# Patient Record
Sex: MALE | Race: ASIAN
[De-identification: names, ages, dates, MRNs, and addresses within clinical notes are randomized per-mention and may not be internally consistent; named-entity substitution may affect disease eponyms.]

---

## 2017-08-03 ENCOUNTER — HOSPITAL ENCOUNTER (EMERGENCY)
Dept: HOSPITAL 74 - JERFT | Age: 62
Discharge: HOME | End: 2017-08-03
Payer: COMMERCIAL

## 2017-08-03 VITALS — SYSTOLIC BLOOD PRESSURE: 147 MMHG | DIASTOLIC BLOOD PRESSURE: 97 MMHG | HEART RATE: 64 BPM | TEMPERATURE: 98.1 F

## 2017-08-03 VITALS — BODY MASS INDEX: 29.2 KG/M2

## 2017-08-03 DIAGNOSIS — Z48.02: Primary | ICD-10-CM

## 2017-08-03 NOTE — PDOC
Suture Removal/Wound Check HPI





- History of Present Illness


Chief Complaint: Suture/Staple Removal(Here)


Stated Complaint: SUTURE REMOVAL


Time Seen by Provider: 08/03/17 12:28


History Source: Yes: Patient


Exam Limitations: Yes: No Limitations


Treated at: [facility] ED





- Previous ED Treatment


Type of procedure performed on last visit: Yes: Laceration Repair


Tetanus Immunization: Yes: Up to Date


Antibiotics Prescribed: No





Past History





- Travel


Traveled outside of the country in the last 30 days: No


Close contact w/someone who was outside of country & ill: No





- Past Medical History


Allergies/Adverse Reactions: 


Allergies





No Known Allergies Allergy (Verified 08/03/17 11:54)


 








Home Medications: 


Ambulatory Orders





Metoprolol Succinate [Toprol Xl] 100 mg PO DAILY 11/09/16 


Amlodipine Besylate 10 mg PO ASDIR 07/19/17 








General: Yes: no pertinent history





- Immunization History


Tetanus Status: Unknown





- Social History


Smoking Status: Never smoked


Number of Ciarettes Per Day: 0





Suture Removal/Wound Check PE





- Physical Exam


Laceration/Wound Check Symptoms: reports: None


Current Severity Level: None


Maximum Severity Level: None


Location of Laceration/Wound: left: Ankle (9 sutures removed from left ankle )


Pain Radiation: None





*Review of Systems





- Review of Systems


Able to Perform ROS?: Yes


Constitutional: Yes: Symptoms Reported, See HPI, Loss of Appetite


HEENTM: No: Symptoms Reported


Musculoskeletal: Yes: See HPI.  No: Symptoms Reported, Back Pain, Joint Pain, 

Joint Swelling


Integumentary: Yes: Symptoms Reported


Neurological: No: Symptoms reported


All Other Systems: Reviewed and Negative





Medical Decision Making





- Medical Decision Making





08/03/17 13:24 is removed from left ankle and right thumb without incident. 

Steri-Strips applied to poorly approximated left ankle laceration line 

midpoint. No purulent drainage, no evidence of cellulitis or infection.





*DC/Admit/Observation/Transfer


Diagnosis at time of Disposition: 


 Encounter for removal of sutures





- Discharge Dispostion


Disposition: HOME


Condition at time of disposition: Stable


Admit: No





- Referrals


Referrals: 


Xiomara Roy MD [Primary Care Provider] - 





- Patient Instructions





- Post Discharge Activity

## 2018-02-02 ENCOUNTER — HOSPITAL ENCOUNTER (OUTPATIENT)
Dept: HOSPITAL 74 - JER | Age: 63
Setting detail: OBSERVATION
LOS: 1 days | Discharge: HOME | End: 2018-02-03
Payer: COMMERCIAL

## 2018-02-02 VITALS — BODY MASS INDEX: 27.5 KG/M2

## 2018-02-02 DIAGNOSIS — E78.5: ICD-10-CM

## 2018-02-02 DIAGNOSIS — Z87.891: ICD-10-CM

## 2018-02-02 DIAGNOSIS — I10: ICD-10-CM

## 2018-02-02 DIAGNOSIS — E11.9: ICD-10-CM

## 2018-02-02 DIAGNOSIS — R11.2: ICD-10-CM

## 2018-02-02 DIAGNOSIS — Z79.84: ICD-10-CM

## 2018-02-02 DIAGNOSIS — K56.609: Primary | ICD-10-CM

## 2018-02-02 LAB
ALBUMIN SERPL-MCNC: 4.5 G/DL (ref 3.4–5)
ALP SERPL-CCNC: 107 U/L (ref 45–117)
ALT SERPL-CCNC: 24 U/L (ref 12–78)
ANION GAP SERPL CALC-SCNC: 11 MMOL/L (ref 8–16)
APTT BLD: 29.9 SECONDS (ref 26.9–34.4)
AST SERPL-CCNC: 14 U/L (ref 15–37)
BASOPHILS # BLD: 0.3 % (ref 0–2)
BILIRUB SERPL-MCNC: 0.8 MG/DL (ref 0.2–1)
BUN SERPL-MCNC: 12 MG/DL (ref 7–18)
CALCIUM SERPL-MCNC: 8.7 MG/DL (ref 8.5–10.1)
CHLORIDE SERPL-SCNC: 100 MMOL/L (ref 98–107)
CO2 SERPL-SCNC: 24 MMOL/L (ref 21–32)
CREAT SERPL-MCNC: 1.2 MG/DL (ref 0.7–1.3)
DEPRECATED RDW RBC AUTO: 13.1 % (ref 11.9–15.9)
EOSINOPHIL # BLD: 0 % (ref 0–4.5)
GLUCOSE SERPL-MCNC: 272 MG/DL (ref 74–106)
HCT VFR BLD CALC: 42.6 % (ref 35.4–49)
HGB BLD-MCNC: 13.6 GM/DL (ref 11.7–16.9)
INR BLD: 1.17 (ref 0.82–1.09)
LIPASE SERPL-CCNC: 117 U/L (ref 73–393)
LYMPHOCYTES # BLD: 5.7 % (ref 8–40)
MCH RBC QN AUTO: 27.3 PG (ref 25.7–33.7)
MCHC RBC AUTO-ENTMCNC: 31.9 G/DL (ref 32–35.9)
MCV RBC: 85.7 FL (ref 80–96)
MONOCYTES # BLD AUTO: 2.5 % (ref 3.8–10.2)
NEUTROPHILS # BLD: 91.5 % (ref 42.8–82.8)
PLATELET # BLD AUTO: 266 K/MM3 (ref 134–434)
PMV BLD: 10.6 FL (ref 7.5–11.1)
POTASSIUM SERPLBLD-SCNC: 4 MMOL/L (ref 3.5–5.1)
PROT SERPL-MCNC: 7.9 G/DL (ref 6.4–8.2)
PT PNL PPP: 13.2 SEC (ref 9.98–11.88)
RBC # BLD AUTO: 4.96 M/MM3 (ref 4–5.6)
SODIUM SERPL-SCNC: 135 MMOL/L (ref 136–145)
WBC # BLD AUTO: 13.7 K/MM3 (ref 4–10)

## 2018-02-02 PROCEDURE — G0378 HOSPITAL OBSERVATION PER HR: HCPCS

## 2018-02-02 PROCEDURE — 3E033NZ INTRODUCTION OF ANALGESICS, HYPNOTICS, SEDATIVES INTO PERIPHERAL VEIN, PERCUTANEOUS APPROACH: ICD-10-PCS

## 2018-02-02 PROCEDURE — 3E0337Z INTRODUCTION OF ELECTROLYTIC AND WATER BALANCE SUBSTANCE INTO PERIPHERAL VEIN, PERCUTANEOUS APPROACH: ICD-10-PCS

## 2018-02-02 PROCEDURE — 3E033GC INTRODUCTION OF OTHER THERAPEUTIC SUBSTANCE INTO PERIPHERAL VEIN, PERCUTANEOUS APPROACH: ICD-10-PCS

## 2018-02-02 RX ADMIN — INSULIN ASPART SCH: 100 INJECTION, SOLUTION INTRAVENOUS; SUBCUTANEOUS at 17:13

## 2018-02-02 RX ADMIN — HEPARIN SODIUM SCH UNIT: 5000 INJECTION, SOLUTION INTRAVENOUS; SUBCUTANEOUS at 21:57

## 2018-02-02 NOTE — PDOC
History of Present Illness





<Jourdan Gutierrez - Last Filed: 02/02/18 13:13>





- History of Present Illness


Initial Comments: 





02/02/18 09:18


"The patient is a 62 year old male with a significant PMH of HTN, hyperlipidemia

, and diabetes who presents to the emergency department with vomiting and 

abdominal pain beginning approximately last night. The patient notes he has had 

about 10 episodes of vomiting since last night with associated diffuse, burning 

abdominal pain. Denies diarrhea or constipation. However, he notes feeling as 

if his abdomen feels distended. The patient notes his last BM was at about 3AM 

last night. Of note, the patient ate a papaya at about 1PM yesterday and is 

concerned about food poisoning. Denies F/C.





The patient denies chest pain, shortness of breath, headache and dizziness. 


Denies fever, chills, diarrhea and constipation. 


Denies dysuria, frequency, urgency and hematuria.





Allergies: NKA


Past surgical history: Cholecystectomy. Unspecified eye surgery. 


Social history: Former smoker. No reported alcohol or drug use. 


PCP: Dr. Xiomara Roy


"





<Tavares Adhikari - Last Filed: 02/03/18 18:30>





- General


Chief Complaint: Nausea/Vomiting


Stated Complaint: VOMITING


Time Seen by Provider: 02/02/18 08:43





Past History





<Jourdan Gutierrez - Last Filed: 02/02/18 13:13>





- Past Medical History


COPD: No


Diabetes: Yes


GI Disorders: No


HTN: Yes


Hypercholesterolemia: Yes





- Surgical History


Cholecystectomy: Yes (11/11/16)





- Suicide/Smoking/Psychosocial Hx


Smoking History: Former smoker


Have you smoked in the past 12 months: No


Number of Cigarettes Smoked Daily: 0


Information on smoking cessation initiated: No


Hx Alcohol Use: No


Drug/Substance Use Hx: No


Substance Use Type: None





<Tavares Adhikari - Last Filed: 02/03/18 18:30>





- Past Medical History


Allergies/Adverse Reactions: 


 Allergies











Allergy/AdvReac Type Severity Reaction Status Date / Time


 


No Known Allergies Allergy   Verified 02/02/18 07:46











Home Medications: 


Ambulatory Orders





Metoprolol Succinate [Toprol Xl] 100 mg PO DAILY 11/09/16 


Amlodipine Besylate 10 mg PO DAILY 07/19/17 


Docusate Sodium [Colace] 100 mg PO DAILY 02/02/18 


Metformin HCl 500 mg PO BID 02/02/18 


Simvastatin 40 mg PO DAILY 02/02/18 


Acetaminophen [Tylenol .Regular Strength -] 650 mg PO Q4H PRN  tablet 02/03/18 











**Review of Systems





- Review of Systems


Comments:: 





02/02/18 09:28


"GENERAL/CONSTITUTIONAL: No fever or chills. No weakness.


HEAD, EYES, EARS, NOSE AND THROAT: No change in vision. No ear pain or 

discharge. No sore throat.


CARDIOVASCULAR: No chest pain or shortness of breath.


RESPIRATORY: No cough, wheezing, or hemoptysis.


GASTROINTESTINAL: (+) Vomiting. (+) Diffuse abdominal pain.  No diarrhea or 

constipation.


GENITOURINARY: No dysuria, frequency, or change in urination.


MUSCULOSKELETAL: No joint or muscle swelling or pain. No neck or back pain.


SKIN: No rash


NEUROLOGIC: No headache, vertigo, loss of consciousness, or change in strength/

sensation.


ENDOCRINE: No increased thirst. No abnormal weight change.


HEMATOLOGIC/LYMPHATIC: No anemia, easy bleeding, or history of blood clots.


ALLERGIC/IMMUNOLOGIC: No hives or skin allergy.


"





<Tavares Adhikari - Last Filed: 02/03/18 18:30>





*Physical Exam





- Vital Signs


 Last Vital Signs











Temp Pulse Resp BP Pulse Ox


 


 98.2 F   92 H  20   151/88   99 


 


 02/02/18 07:31  02/02/18 07:31  02/02/18 07:31  02/02/18 07:31  02/02/18 07:31














<Jourdan Gutierrez - Last Filed: 02/02/18 13:13>





- Vital Signs


 Last Vital Signs











Temp Pulse Resp BP Pulse Ox


 


 98.2 F   92 H  20   151/88   99 


 


 02/02/18 07:31  02/02/18 07:31  02/02/18 07:31  02/02/18 07:31  02/02/18 07:31














- Physical Exam


Comments: 





02/02/18 09:28


"GENERAL: Awake, alert, and fully oriented, in no acute distress


HEAD: No signs of trauma


EYES: PERRLA, EOMI, sclera anicteric, conjunctiva clear


ENT: Auricles normal inspection, hearing grossly normal, nares patent, 

oropharynx clear without exudates. Moist mucosa


NECK: Nontender, no stepoffs, Normal ROM, supple, no lymphadenopathy, JVD, or 

masses


LUNGS: Breath sounds equal, clear to auscultation bilaterally.  No wheezes, and 

no crackles


HEART: Regular rate and rhythm, normal S1 and S2, no murmurs, rubs or gallops


ABDOMEN: soft, diffuse TTP, non-tympanitic


EXTREMITIES: Normal range of motion, no edema.  No clubbing or cyanosis. No 

cords, erythema, or tenderness


NEUROLOGICAL: Cranial nerves II through XII intact. 5/5 strength and sensation 

in all extremities, Normal speech, normal gait


SKIN: Warm, Dry, normal turgor, no rashes or lesions noted.


"





<Tavares Adhikari - Last Filed: 02/03/18 18:30>





ED Treatment Course





- LABORATORY


CBC & Chemistry Diagram: 


 02/02/18 08:55





 02/02/18 08:55





- ADDITIONAL ORDERS


Additional order review: 


 Laboratory  Results











  02/02/18 02/02/18 02/02/18





  09:04 08:55 08:55


 


PT with INR   


 


INR   


 


PTT (Actin FS)   


 


Sodium    135 L


 


Potassium    4.0


 


Chloride    100


 


Carbon Dioxide    24


 


Anion Gap    11


 


BUN    12


 


Creatinine    1.2


 


Creat Clearance w eGFR    > 60


 


Random Glucose    272 H D


 


Calcium    8.7


 


Total Bilirubin    0.8


 


AST    14 L D


 


ALT    24  D


 


Alkaline Phosphatase    107  D


 


Creatine Kinase  67  


 


Troponin I  < 0.02  


 


Total Protein    7.9


 


Albumin    4.5  D


 


Lipase    117


 


Blood Type   AB POSITIVE 


 


Antibody Screen   Negative 














  02/02/18





  08:55


 


PT with INR  13.20 H


 


INR  1.17 H


 


PTT (Actin FS)  29.9


 


Sodium 


 


Potassium 


 


Chloride 


 


Carbon Dioxide 


 


Anion Gap 


 


BUN 


 


Creatinine 


 


Creat Clearance w eGFR 


 


Random Glucose 


 


Calcium 


 


Total Bilirubin 


 


AST 


 


ALT 


 


Alkaline Phosphatase 


 


Creatine Kinase 


 


Troponin I 


 


Total Protein 


 


Albumin 


 


Lipase 


 


Blood Type 


 


Antibody Screen 








 











  02/02/18





  08:55


 


RBC  4.96


 


MCV  85.7


 


MCHC  31.9 L


 


RDW  13.1


 


MPV  10.6  D


 


Neutrophils %  91.5 H D


 


Lymphocytes %  5.7 L D


 


Monocytes %  2.5 L


 


Eosinophils %  0.0  D


 


Basophils %  0.3














- Medications


Given in the ED: 


ED Medications














Discontinued Medications














Generic Name Dose Route Start Last Admin





  Trade Name Freq  PRN Reason Stop Dose Admin


 


Sodium Chloride  1,000 mls @ 1,000 mls/hr  02/02/18 08:49  02/02/18 09:03





  Normal Saline -  IV  02/02/18 09:48  1,000 mls/hr





  ASDIR STA   Administration


 


Morphine Sulfate  4 mg  02/02/18 08:49  02/02/18 09:03





  Morphine Injection -  IVPUSH  02/02/18 08:50  4 mg





  ONCE ONE   Administration


 


Ondansetron HCl  4 mg  02/02/18 08:49  02/02/18 09:00





  Zofran Injection  IVPB  02/02/18 08:50  4 mg





  ONCE ONE   Administration














- Additional Consults


Time Called: 13:05


Consult/PCP: Spoke [facility] Dr. Perera who accepted this patient for admission.





<Jourdan Gutierrez - Last Filed: 02/02/18 13:13>





- LABORATORY


CBC & Chemistry Diagram: 


 02/03/18 06:45





 02/03/18 06:45





- RADIOLOGY


Radiology Studies Ordered: 














 Category Date Time Status


 


 ABDOMEN & PELVIS CT WITH CONTR [CT] Stat CT Scan  02/02/18 08:53 Ordered


 


 ABDOMEN FLAT-UPRIGHT-LATERAL [RAD] Stat Radiology  02/02/18 08:48 Ordered














- Medications


Given in the ED: 


ED Medications














Discontinued Medications














Generic Name Dose Route Start Last Admin





  Trade Name Odalis  PRN Reason Stop Dose Admin


 


Morphine Sulfate  4 mg  02/02/18 08:49  02/02/18 09:03





  Morphine Injection -  IVPUSH  02/02/18 08:50  4 mg





  ONCE ONE   Administration


 


Ondansetron HCl  4 mg  02/02/18 08:49  02/02/18 09:00





  Zofran Injection  IVPB  02/02/18 08:50  4 mg





  ONCE ONE   Administration














<Tavares Adhikari - Last Filed: 02/03/18 18:30>





Medical Decision Making





- Medical Decision Making





02/02/18 09:29


62 M with abdominal distention, N+V. Concerning for SBO (pt has had prior lap 

leandro). Also consider pancreatitis. Anginal equivalent unlikely but pt with 

cardiac risk factors, so will r/o ACS with trop and EKG.


- Labs, lipase, trop


- EKG


- CTAP





02/02/18 12:52


CTAP consistent with partial SBO.


Pt's initial surgery done by Dr. Castellanos, who no longer practices here.


Page sent out to Dr. Berger.





Pt admitted to Dr. Perera.





Case discussed in detail with admitting physician including history, physical 

exam and ancillary studies.


Admitting physician has assumed care for the patient and will follow all 

pending diagnostics and complete the evaluation and treatment. 








<Tavares Adhikari - Last Filed: 02/03/18 18:30>





*DC/Admit/Observation/Transfer





- Attestations


Scribe Attestion: 





02/02/18 11:51





Documentation prepared by Jourdan Gutierrez, acting as medical scribe for Tavares Adhikari MD.





<Jourdan Gutierrez - Last Filed: 02/02/18 13:13>





- Discharge Dispostion


Admit: Yes





- Attestations


Physician Attestion: 





02/02/18 12:53








I, Dr. Tavares Adhikari MD, attest that this document has been prepared under my 

direction and personally reviewed by me in its entirety.   I further attest, 

that it accurately reflects all work, treatment, procedures and medical decision

-making performed by me.  





<Tavares Adhikari - Last Filed: 02/03/18 18:30>


Diagnosis at time of Disposition: 


 SBO (small bowel obstruction)








- Discharge Dispostion


Disposition: HOME


Condition at time of disposition: Stable

## 2018-02-02 NOTE — CONSULT
- Consultation


REQUESTING PROVIDER: Tavares Adhikari MD





CONSULT REQUEST: We have been asked to surgically evaluate this patient for (

specify).





PCP:Slade Perera





HISTORY OF PRESENT ILLNESS: CTSP who is a 61 y/o male who presented w/n/v/

abdominal pain; NOC; he feels better since arrival in the ER; he has never had 

this before; pain described a scrampy; he also had constipation; he gave 

himself an enema w/minimal result. He states he is passing flatus.








PMHx:  hypertension; NIDDM; hyperlipidemia        





PSHx:    lap leandro 2016    


 Home Medications











 Medication  Instructions  Recorded


 


Metoprolol Succinate [Toprol Xl] 100 mg PO DAILY 11/09/16


 


Amlodipine Besylate 10 mg PO DAILY 07/19/17


 


Docusate Sodium [Colace] 100 mg PO DAILY 02/02/18


 


Metformin HCl 500 mg PO BID 02/02/18


 


Simvastatin 40 mg PO DAILY 02/02/18








 Allergies











Allergy/AdvReac Type Severity Reaction Status Date / Time


 


No Known Allergies Allergy   Verified 02/02/18 07:46








PHYSICAL EXAM:


GENERAL: Awake, alert, and fully oriented, in no acute distress.


HEAD: Normal with no signs of trauma.


EYES:  sclera anicteric, conjunctiva clear.


NECK: Normal ROM, supple without lymphadenopathy, JVD, or masses.


ABDOMEN: Soft, nontender, not distended, normoactive bowel sounds, no guarding, 

no rebound, no masses.  No organomegaly. No tympany; no hernias; healed lap 

leandro port sites


MUSCULOSKELETAL: Normal ROM at all joints. No bony deformities or tenderness. 

No CVA tenderness.


UPPER EXTREMITIES: 2+ pulses, warm, well-perfused. No cyanosis. Cap refill <2 

seconds. No peripheral edema.


LOWER EXTREMITIES: 2+ pulses, warm, well-perfused. No calf tenderness. No 

peripheral edema. 


NEUROLOGICAL: Normal speech, gait not observed.


PSYCH: Cooperative. Good eye contact. Appropriate mood and affect.


SKIN: Warm, dry, normal turgor, no rashes or lesions noted.


 Vital Signs











Temperature  98.2 F   02/02/18 07:31


 


Pulse Rate  85   02/02/18 14:06


 


Respiratory Rate  18   02/02/18 14:06


 


Blood Pressure  146/66   02/02/18 14:06


 


O2 Sat by Pulse Oximetry (%)  99   02/02/18 14:06








 Lab Results











WBC  13.7 K/mm3 (4.0-10.0)  H D 02/02/18  08:55    


 


RBC  4.96 M/mm3 (4.00-5.60)   02/02/18  08:55    


 


Hgb  13.6 GM/dL (11.7-16.9)   02/02/18  08:55    


 


Hct  42.6 % (35.4-49)   02/02/18  08:55    


 


MCV  85.7 fl (80-96)   02/02/18  08:55    


 


MCHC  31.9 g/dl (32.0-35.9)  L  02/02/18  08:55    


 


RDW  13.1 % (11.9-15.9)   02/02/18  08:55    


 


Plt Count  266 K/MM3 (134-434)   02/02/18  08:55    


 


Sodium  135 mmol/L (136-145)  L  02/02/18  08:55    


 


Potassium  4.0 mmol/L (3.5-5.1)   02/02/18  08:55    


 


Chloride  100 mmol/L ()   02/02/18  08:55    


 


Carbon Dioxide  24 mmol/L (21-32)   02/02/18  08:55    


 


Anion Gap  11  (8-16)   02/02/18  08:55    


 


BUN  12 mg/dL (7-18)   02/02/18  08:55    


 


Creatinine  1.2 mg/dL (0.7-1.3)   02/02/18  08:55    


 


Random Glucose  272 mg/dL ()  H D 02/02/18  08:55    


 


Calcium  8.7 mg/dL (8.5-10.1)   02/02/18  08:55    


 


Blood Type  AB POSITIVE   02/02/18  12:02    


 


Antibody Screen  Negative   02/02/18  08:55    


 


INR  1.17  (0.82-1.09)  H  02/02/18  08:55    








W/U to date reviewed





IMP: No clearcut clinical and/or radiological evidence of significant SBO





PLAN: Would admit and keep NPO; IVF; serial exams and serial abdominal 

obstructive series x rays; will f/u.








Tavares Berger MD FACS





Visit type





- Case Type


Case Type: ED Admission





- Emergency


Emergency Visit: Yes


ED Registration Date: 02/02/18


Care time: The patient presented to the Emergency Department on the above date 

and was hospitalized for further evaluation of their emergent condition.





- New patient


This patient is new to me today: Yes


Date on this admission: 02/02/18





- Critical Care


Critical Care patient: No

## 2018-02-02 NOTE — HP
Admitting History and Physical





- Primary Care Physician


PCP: Xiomara Roy





- Admission


History of Present Illness: 





The patient is a 62 year old male --patient of Dr. ALLISON Roy , with a significant 

PMH of HTN, hyperlipidemia, cholecystectomy and diabetes who presents to the 

emergency department with vomiting and abdominal pain beginning approximately 

last night. The patient notes he has had about 10 episodes of vomiting since 

last night with associated diffuse, burning abdominal pain. Denies diarrhea or 

constipation. However, he notes feeling as if his abdomen feels distended. The 

patient notes his last BM was at about 3AM last night. Of note, the patient ate 

a papaya at about 1PM yesterday and is concerned about food poisoning. Denies F/

C.


The patient denies chest pain, shortness of breath, headache and dizziness. 


Denies fever, chills, diarrhea and constipation. 


Denies dysuria, frequency, urgency and hematuria.





CAT scan done in the  emergency room--- showed partial small bowel obstruction


Patient admitted to floor


Surgical consult requested  by emergency room physician--- patient seen by 

surgeon


I also discussed case with the ER physician


Patient seen by me on the floor


Feels better.


  Comfortable


Denies pain


Denies nausea or vomiting at present


Passing gas.





History Source: Patient


Limitations to Obtaining History: No Limitations





- Past Medical History


Cardiovascular: Yes: HTN


Endocrine: Yes: Diabetes Mellitus





- Past Surgical History


Past Surgical History: Yes: Cholecystectomy





- Smoking History


Smoking history: Former smoker


Have you smoked in the past 12 months: No


Aproximately how many cigarettes per day: 0





- Alcohol/Substance Use


Hx Alcohol Use: No





Home Medications





- Allergies


Allergies/Adverse Reactions: 


 Allergies











Allergy/AdvReac Type Severity Reaction Status Date / Time


 


No Known Allergies Allergy   Verified 02/02/18 07:46














- Home Medications


Home Medications: 


Ambulatory Orders





Metoprolol Succinate [Toprol Xl] 100 mg PO DAILY 11/09/16 


Amlodipine Besylate 10 mg PO DAILY 07/19/17 


Docusate Sodium [Colace] 100 mg PO DAILY 02/02/18 


Metformin HCl 500 mg PO BID 02/02/18 


Simvastatin 40 mg PO DAILY 02/02/18 











Family Disease History





- Family Disease History


Family History: Unremarkable





Review of Systems





- Review of Systems


Constitutional: reports: No Symptoms


Eyes: reports: No Symptoms


HENT: reports: No Symptoms


Neck: reports: No Symptoms


Cardiovascular: reports: No Symptoms


Respiratory: reports: No Symptoms


Gastrointestinal: reports: Abdominal Pain, Bloating, Nausea, Vomiting


Genitourinary: reports: No Symptoms


Neurological: reports: No Symptoms


Endocrine: reports: No Symptoms


Psychiatric: reports: No Symptoms





Physical Examination


Vital Signs: 


 Vital Signs











Temperature  98.2 F   02/02/18 07:31


 


Pulse Rate  85   02/02/18 14:06


 


Respiratory Rate  18   02/02/18 14:06


 


Blood Pressure  146/66   02/02/18 14:06


 


O2 Sat by Pulse Oximetry (%)  99   02/02/18 14:06











Constitutional: Yes: No Distress, Calm


Eyes: Yes: Conjunctiva Clear


Neck: Yes: Supple, Trachea Midline


Cardiovascular: Yes: Regular Rate and Rhythm


Respiratory: Yes: CTA Bilaterally


Gastrointestinal: Yes: Soft (Nontender. Bowel sounds present.)


Edema: No


Neurological: Yes: Alert


Psychiatric: Yes: Alert


Labs: 


 CBC, BMP





 02/02/18 08:55 





 02/02/18 08:55 











Imaging





- Results


X-ray: Report Reviewed


Cat Scan: Report Reviewed





Problem List





- Problems


(1) Diabetes


Code(s): E11.9 - TYPE 2 DIABETES MELLITUS WITHOUT COMPLICATIONS   





(2) Hypertension


Code(s): I10 - ESSENTIAL (PRIMARY) HYPERTENSION   





(3) Nausea and vomiting


Code(s): R11.2 - NAUSEA WITH VOMITING, UNSPECIFIED   


Qualifiers: 


   Vomiting type: unspecified   Vomiting Intractability: non-intractable   

Qualified Code(s): R11.2 - Nausea with vomiting, unspecified   





(4) SBO (small bowel obstruction)


Code(s): K56.609 - UNSP INTESTNL OBST, UNSP AS TO PARTIAL VERSUS COMPLETE OBST 

  





Assessment/Plan





I believe partial small bowel obstruction is resolving


Will keep nothing by mouth tonight


IV fluids


Monitor


Follow-up electrolytes


Anticipate short stay


Trial of liquid diet in the morning


If better--anticipate discharge soon.

## 2018-02-03 VITALS — HEART RATE: 63 BPM | TEMPERATURE: 97.7 F | SYSTOLIC BLOOD PRESSURE: 138 MMHG | DIASTOLIC BLOOD PRESSURE: 54 MMHG

## 2018-02-03 LAB
ALBUMIN SERPL-MCNC: 3.5 G/DL (ref 3.4–5)
ALP SERPL-CCNC: 86 U/L (ref 45–117)
ALT SERPL-CCNC: 25 U/L (ref 12–78)
ANION GAP SERPL CALC-SCNC: 6 MMOL/L (ref 8–16)
AST SERPL-CCNC: 17 U/L (ref 15–37)
BASOPHILS # BLD: 0.5 % (ref 0–2)
BILIRUB SERPL-MCNC: 0.7 MG/DL (ref 0.2–1)
BUN SERPL-MCNC: 10 MG/DL (ref 7–18)
CALCIUM SERPL-MCNC: 8.2 MG/DL (ref 8.5–10.1)
CHLORIDE SERPL-SCNC: 105 MMOL/L (ref 98–107)
CO2 SERPL-SCNC: 31 MMOL/L (ref 21–32)
CREAT SERPL-MCNC: 1.1 MG/DL (ref 0.7–1.3)
DEPRECATED RDW RBC AUTO: 13.4 % (ref 11.9–15.9)
EOSINOPHIL # BLD: 1.9 % (ref 0–4.5)
GLUCOSE SERPL-MCNC: 136 MG/DL (ref 74–106)
HCT VFR BLD CALC: 37 % (ref 35.4–49)
HGB BLD-MCNC: 12.1 GM/DL (ref 11.7–16.9)
LYMPHOCYTES # BLD: 39.7 % (ref 8–40)
MCH RBC QN AUTO: 27.8 PG (ref 25.7–33.7)
MCHC RBC AUTO-ENTMCNC: 32.6 G/DL (ref 32–35.9)
MCV RBC: 85.2 FL (ref 80–96)
MONOCYTES # BLD AUTO: 7.7 % (ref 3.8–10.2)
NEUTROPHILS # BLD: 50.2 % (ref 42.8–82.8)
PLATELET # BLD AUTO: 209 K/MM3 (ref 134–434)
PMV BLD: 9.6 FL (ref 7.5–11.1)
POTASSIUM SERPLBLD-SCNC: 3.7 MMOL/L (ref 3.5–5.1)
PROT SERPL-MCNC: 6.5 G/DL (ref 6.4–8.2)
RBC # BLD AUTO: 4.34 M/MM3 (ref 4–5.6)
SODIUM SERPL-SCNC: 142 MMOL/L (ref 136–145)
WBC # BLD AUTO: 6.6 K/MM3 (ref 4–10)

## 2018-02-03 RX ADMIN — INSULIN ASPART SCH: 100 INJECTION, SOLUTION INTRAVENOUS; SUBCUTANEOUS at 06:13

## 2018-02-03 RX ADMIN — HEPARIN SODIUM SCH UNIT: 5000 INJECTION, SOLUTION INTRAVENOUS; SUBCUTANEOUS at 09:23

## 2018-02-03 RX ADMIN — INSULIN ASPART SCH UNIT: 100 INJECTION, SOLUTION INTRAVENOUS; SUBCUTANEOUS at 12:09

## 2018-02-03 RX ADMIN — HEPARIN SODIUM SCH: 5000 INJECTION, SOLUTION INTRAVENOUS; SUBCUTANEOUS at 09:31

## 2018-02-03 NOTE — PN
Progress Note (short form)





- Note


Progress Note: 





Attending Surgeon





No c/o; passing flatus and had a BM





VSS AF





abdomen-soft; flat and non tender; no tympany; o/w negative.





Obstructive series done this AM shows to me no evidence of SBO








IMP: improved





PLAN: Trial of clear liquids and advance as tolerated.





Tavares Berger MD FACS

## 2018-02-03 NOTE — DS
Physical Examination


Vital Signs: 


 Vital Signs











Temperature  98.7 F   02/03/18 06:00


 


Pulse Rate  70   02/03/18 06:00


 


Respiratory Rate  18   02/03/18 06:00


 


Blood Pressure  116/70   02/03/18 06:00


 


O2 Sat by Pulse Oximetry (%)  98   02/02/18 22:00











Findings/Remarks: 





feels well


no complains


denies pain.


tolerating diet


passing gas


wants to go home





Constitutional: Yes: No Distress, Calm


Neck: Yes: Supple


Cardiovascular: Yes: Regular Rate and Rhythm


Respiratory: Yes: CTA Bilaterally


Gastrointestinal: Yes: Normal Bowel Sounds, Soft


Edema: No


Labs: 


 CBC, BMP





 02/03/18 06:45 





 02/03/18 06:45 











Discharge Summary


Reason For Visit: SMALL BOWEL OBSTRUCTION


Current Active Problems





Diabetes (Acute)


Hypertension (Acute)








Hospital Course: 





admitted for partial sbo


treated conservatively.


much better


resolved


will d/c home


instructed to advance diet slowly


advised to follow with his pmd in week








Condition: Stable





- Instructions


Referrals: 


Xiomara Roy MD [Primary Care Provider] - 


Disposition: HOME





- Home Medications


Comprehensive Discharge Medication List: 


Ambulatory Orders





Metoprolol Succinate [Toprol Xl] 100 mg PO DAILY 11/09/16 


Amlodipine Besylate 10 mg PO DAILY 07/19/17 


Docusate Sodium [Colace] 100 mg PO DAILY 02/02/18 


Metformin HCl 500 mg PO BID 02/02/18 


Simvastatin 40 mg PO DAILY 02/02/18 


Acetaminophen [Tylenol .Regular Strength -] 650 mg PO Q4H PRN  tablet 02/03/18

## 2018-02-03 NOTE — EKG
Test Reason : 

Blood Pressure : ***/*** mmHG

Vent. Rate : 088 BPM     Atrial Rate : 088 BPM

   P-R Int : 162 ms          QRS Dur : 082 ms

    QT Int : 374 ms       P-R-T Axes : 038 -12 006 degrees

   QTc Int : 452 ms

 

NORMAL SINUS RHYTHM

POSSIBLE LEFT ATRIAL ENLARGEMENT

LEFT VENTRICULAR HYPERTROPHY

ABNORMAL ECG

WHEN COMPARED WITH ECG OF 16-NOV-2016 18:32,

VENT. RATE HAS INCREASED BY  32 BPM

BASELINE ARTIFACT

Confirmed by PETER NUNEZ, STEFANIE ([id number]) on 2/3/2018 1:35:00 PM

 

Referred By:             Confirmed By:STEFANIE GREEN MD

## 2023-03-03 ENCOUNTER — HOSPITAL ENCOUNTER (EMERGENCY)
Dept: HOSPITAL 74 - JERFT | Age: 68
Discharge: HOME | End: 2023-03-03
Payer: COMMERCIAL

## 2023-03-03 VITALS
HEART RATE: 68 BPM | RESPIRATION RATE: 20 BRPM | DIASTOLIC BLOOD PRESSURE: 65 MMHG | SYSTOLIC BLOOD PRESSURE: 122 MMHG | TEMPERATURE: 99 F

## 2023-03-03 VITALS — BODY MASS INDEX: 26.8 KG/M2

## 2023-03-03 DIAGNOSIS — N41.0: ICD-10-CM

## 2023-03-03 DIAGNOSIS — N39.0: Primary | ICD-10-CM

## 2023-03-03 LAB
ALBUMIN SERPL-MCNC: 3.1 G/DL (ref 3.4–5)
ALP SERPL-CCNC: 86 U/L (ref 45–117)
ALT SERPL-CCNC: 20 U/L (ref 13–61)
ANION GAP SERPL CALC-SCNC: 8 MMOL/L (ref 8–16)
ANION GAP SERPL CALC-SCNC: 8 MMOL/L (ref 8–16)
ANISOCYTOSIS BLD QL: (no result)
APPEARANCE UR: (no result)
AST SERPL-CCNC: 50 U/L (ref 15–37)
BACTERIA # UR AUTO: (no result) /UL (ref 0–1359)
BASOPHILS # BLD: 0.7 % (ref 0–2)
BILIRUB SERPL-MCNC: 1.2 MG/DL (ref 0.2–1)
BILIRUB UR STRIP.AUTO-MCNC: NEGATIVE MG/DL
BUN SERPL-MCNC: 13.6 MG/DL (ref 7–18)
BUN SERPL-MCNC: 15.6 MG/DL (ref 7–18)
CALCIUM SERPL-MCNC: 8 MG/DL (ref 8.5–10.1)
CALCIUM SERPL-MCNC: 8.2 MG/DL (ref 8.5–10.1)
CASTS URNS QL MICRO: 1 /UL (ref 0–3.1)
CHLORIDE SERPL-SCNC: 100 MMOL/L (ref 98–107)
CHLORIDE SERPL-SCNC: 92 MMOL/L (ref 98–107)
CO2 SERPL-SCNC: 25 MMOL/L (ref 21–32)
CO2 SERPL-SCNC: 26 MMOL/L (ref 21–32)
COLOR UR: YELLOW
CREAT SERPL-MCNC: 0.7 MG/DL (ref 0.55–1.3)
CREAT SERPL-MCNC: 1 MG/DL (ref 0.55–1.3)
DEPRECATED RDW RBC AUTO: 13 % (ref 11.9–15.9)
EOSINOPHIL # BLD: 0.1 % (ref 0–4.5)
EPITH CASTS URNS QL MICRO: 6 /UL (ref 0–25.1)
GLUCOSE SERPL-MCNC: 168 MG/DL (ref 74–106)
GLUCOSE SERPL-MCNC: 186 MG/DL (ref 74–106)
HCT VFR BLD CALC: 38.4 % (ref 35.4–49)
HGB BLD-MCNC: 12.7 GM/DL (ref 11.7–16.9)
KETONES UR QL STRIP: (no result)
LEUKOCYTE ESTERASE UR QL STRIP.AUTO: (no result)
LYMPHOCYTES # BLD: 10.8 % (ref 8–40)
MACROCYTES BLD QL: 0
MCH RBC QN AUTO: 27.3 PG (ref 25.7–33.7)
MCHC RBC AUTO-ENTMCNC: 33.2 G/DL (ref 32–35.9)
MCV RBC: 82.3 FL (ref 80–96)
MONOCYTES # BLD AUTO: 8.5 % (ref 3.8–10.2)
NEUTROPHILS # BLD: 79.9 % (ref 42.8–82.8)
NITRITE UR QL STRIP: NEGATIVE
PH UR: 6 [PH] (ref 5–8)
PLATELET # BLD AUTO: 232 10^3/UL (ref 134–434)
PMV BLD: 8.2 FL (ref 7.5–11.1)
PROT SERPL-MCNC: 7 G/DL (ref 6.4–8.2)
PROT UR QL STRIP: (no result)
PROT UR QL STRIP: NEGATIVE
RBC # BLD AUTO: 37 /UL (ref 0–23.9)
RBC # BLD AUTO: 4.66 M/MM3 (ref 4–5.6)
SODIUM SERPL-SCNC: 126 MMOL/L (ref 136–145)
SODIUM SERPL-SCNC: 133 MMOL/L (ref 136–145)
SP GR UR: 1.01 (ref 1.01–1.03)
UROBILINOGEN UR STRIP-MCNC: 0.2 MG/DL (ref 0.2–1)
WBC # BLD AUTO: 15.1 K/MM3 (ref 4–10)
WBC # UR AUTO: 2878 /UL (ref 0–25.8)

## 2025-02-18 ENCOUNTER — HOSPITAL ENCOUNTER (OUTPATIENT)
Dept: HOSPITAL 74 - JASU-ENDO | Age: 70
Discharge: HOME | End: 2025-02-18
Attending: INTERNAL MEDICINE
Payer: COMMERCIAL

## 2025-02-18 VITALS
TEMPERATURE: 97.8 F | DIASTOLIC BLOOD PRESSURE: 76 MMHG | RESPIRATION RATE: 16 BRPM | SYSTOLIC BLOOD PRESSURE: 130 MMHG | HEART RATE: 69 BPM

## 2025-02-18 VITALS — BODY MASS INDEX: 25.9 KG/M2

## 2025-02-18 DIAGNOSIS — D12.0: ICD-10-CM

## 2025-02-18 DIAGNOSIS — E11.9: ICD-10-CM

## 2025-02-18 DIAGNOSIS — K29.50: ICD-10-CM

## 2025-02-18 DIAGNOSIS — Z79.84: ICD-10-CM

## 2025-02-18 DIAGNOSIS — K64.8: ICD-10-CM

## 2025-02-18 DIAGNOSIS — D12.2: ICD-10-CM

## 2025-02-18 DIAGNOSIS — I10: ICD-10-CM

## 2025-02-18 DIAGNOSIS — Z12.11: Primary | ICD-10-CM

## 2025-02-18 LAB
ALBUMIN SERPL-MCNC: 4 G/DL (ref 3.4–5)
ALP SERPL-CCNC: 67 U/L (ref 45–117)
ALT SERPL-CCNC: 25 U/L (ref 13–61)
ANION GAP SERPL CALC-SCNC: 8 MMOL/L (ref 4–13)
AST SERPL-CCNC: 13 U/L (ref 15–37)
BASOPHILS # BLD: 0.4 % (ref 0–2)
BILIRUB SERPL-MCNC: 0.6 MG/DL (ref 0.2–1)
BUN SERPL-MCNC: 11.9 MG/DL (ref 7–18)
CALCIUM SERPL-MCNC: 9 MG/DL (ref 8.5–10.1)
CHLORIDE SERPL-SCNC: 106 MMOL/L (ref 98–107)
CO2 SERPL-SCNC: 27 MMOL/L (ref 21–32)
CREAT SERPL-MCNC: 1.4 MG/DL (ref 0.55–1.3)
DEPRECATED RDW RBC AUTO: 15 % (ref 11.9–15.9)
EOSINOPHIL # BLD: 1.5 % (ref 0–4.5)
GLUCOSE SERPL-MCNC: 102 MG/DL (ref 74–106)
HCT VFR BLD CALC: 34.8 % (ref 35.4–49)
HGB BLD-MCNC: 11.6 GM/DL (ref 11.7–16.9)
LYMPHOCYTES # BLD: 31.9 % (ref 8–40)
MCH RBC QN AUTO: 29.6 PG (ref 25.7–33.7)
MCHC RBC AUTO-ENTMCNC: 33.2 G/DL (ref 32–35.9)
MCV RBC: 89.4 FL (ref 80–96)
MONOCYTES # BLD AUTO: 8.2 % (ref 3.8–10.2)
NEUTROPHILS # BLD: 58 % (ref 42.8–82.8)
PLATELET # BLD AUTO: 251 10^3/UL (ref 134–434)
PMV BLD: 7.8 FL (ref 7.5–11.1)
POTASSIUM SERPLBLD-SCNC: 3.6 MMOL/L (ref 3.5–5.1)
PROT SERPL-MCNC: 6.9 G/DL (ref 6.4–8.2)
RBC # BLD AUTO: 3.9 M/MM3 (ref 4–5.6)
SODIUM SERPL-SCNC: 142 MMOL/L (ref 136–145)
WBC # BLD AUTO: 4.6 K/MM3 (ref 4–10)

## 2025-02-18 PROCEDURE — 0DBK8ZX EXCISION OF ASCENDING COLON, VIA NATURAL OR ARTIFICIAL OPENING ENDOSCOPIC, DIAGNOSTIC: ICD-10-PCS | Performed by: INTERNAL MEDICINE

## 2025-02-18 PROCEDURE — 0DB78ZX EXCISION OF STOMACH, PYLORUS, VIA NATURAL OR ARTIFICIAL OPENING ENDOSCOPIC, DIAGNOSTIC: ICD-10-PCS | Performed by: INTERNAL MEDICINE

## 2025-02-18 PROCEDURE — 0DB68ZX EXCISION OF STOMACH, VIA NATURAL OR ARTIFICIAL OPENING ENDOSCOPIC, DIAGNOSTIC: ICD-10-PCS | Performed by: INTERNAL MEDICINE

## 2025-02-18 PROCEDURE — 0DB98ZX EXCISION OF DUODENUM, VIA NATURAL OR ARTIFICIAL OPENING ENDOSCOPIC, DIAGNOSTIC: ICD-10-PCS | Performed by: INTERNAL MEDICINE

## 2025-06-26 ENCOUNTER — HOSPITAL ENCOUNTER (EMERGENCY)
Dept: HOSPITAL 74 - JER | Age: 70
LOS: 1 days | Discharge: HOME | End: 2025-06-27
Payer: COMMERCIAL

## 2025-06-26 VITALS
RESPIRATION RATE: 18 BRPM | TEMPERATURE: 98.6 F | HEART RATE: 88 BPM | SYSTOLIC BLOOD PRESSURE: 117 MMHG | DIASTOLIC BLOOD PRESSURE: 70 MMHG

## 2025-06-26 VITALS — BODY MASS INDEX: 27.4 KG/M2

## 2025-06-26 DIAGNOSIS — R10.13: ICD-10-CM

## 2025-06-26 DIAGNOSIS — R11.2: Primary | ICD-10-CM

## 2025-06-26 DIAGNOSIS — T78.1XXA: ICD-10-CM

## 2025-06-26 DIAGNOSIS — R51.9: ICD-10-CM

## 2025-06-26 LAB
ALBUMIN SERPL-MCNC: 4 G/DL (ref 3.4–5)
ALP SERPL-CCNC: 87 U/L (ref 45–117)
ALT SERPL-CCNC: 24 U/L (ref 13–61)
ANION GAP SERPL CALC-SCNC: 8 MMOL/L (ref 4–13)
APTT BLD: 28.3 SECONDS (ref 25.2–36.5)
AST SERPL-CCNC: 17 U/L (ref 15–37)
BASOPHILS # BLD AUTO: 0.02 X10^3/UL (ref 0.01–0.08)
BILIRUB CONJ SERPL-MCNC: 0.3 MG/DL (ref 0–0.2)
BILIRUB SERPL-MCNC: 1.2 MG/DL (ref 0.2–1)
BUN SERPL-MCNC: 21.8 MG/DL (ref 7–18)
CALCIUM SERPL-MCNC: 9.4 MG/DL (ref 8.5–10.1)
CASTS URNS QL MICRO: (no result) /UL (ref 0–3.1)
CHLORIDE SERPL-SCNC: 108 MMOL/L (ref 98–107)
CO2 SERPL-SCNC: 28 MMOL/L (ref 21–32)
CREAT SERPL-MCNC: 1.2 MG/DL (ref 0.55–1.3)
CRYSTALS URNS QL MICRO: (no result) /HPF
EOSINOPHIL # BLD AUTO: 0.1 X10^3/UL (ref 0.04–0.54)
EOSINOPHIL NFR BLD AUTO: 1 % (ref 0.8–7)
ERYTHROCYTE [DISTWIDTH] IN BLOOD: 13.1 % (ref 12.2–16.6)
GLUCOSE SERPL-MCNC: 146 MG/DL (ref 74–106)
HCT VFR BLD CALC: 41.8 % (ref 40.1–51)
HCV IGG SERPL QL IA: (no result)
HGB BLD-MCNC: 13.6 G/DL (ref 13.7–17.5)
HIV 1+2 AB+HIV1 P24 AG SERPL QL IA: NEGATIVE
IMM GRANULOCYTES # BLD: 0.09 X10^3/UL (ref 0–0.03)
INR BLD: 1.1 (ref 0.83–1.09)
MAGNESIUM SERPL-MCNC: 1.9 MG/DL (ref 1.8–2.4)
MCHC RBC-ENTMCNC: 32.5 G/DL (ref 32.3–36.5)
MCV RBC: 86.2 FL (ref 79–92.2)
MONOCYTES # BLD AUTO: 0.54 X10^3/UL (ref 0.3–0.82)
MONOCYTES NFR BLD AUTO: 5.2 % (ref 5.3–12.2)
PHOSPHATE SERPL-MCNC: 2.3 MG/DL (ref 2.5–4.9)
PLATELET # BLD AUTO: 213 X10^3/UL (ref 163–337)
PLATELETS.RETICULATED NFR BLD AUTO: (no result) % (ref 0.9–11.2)
PLATELETS.RETICULATED NFR BLD AUTO: (no result) X10^3/UL
PMV BLD: 11.3 FL (ref 9.4–12.4)
POTASSIUM SERPLBLD-SCNC: 4.4 MMOL/L (ref 3.5–5.1)
PROT SERPL-MCNC: 7.5 G/DL (ref 6.4–8.2)
PT PNL PPP: 12 SEC (ref 9.7–13)
SODIUM SERPL-SCNC: 143 MMOL/L (ref 136–145)
YEAST BUDDING URNS QL: (no result)

## 2025-06-26 PROCEDURE — 3E033GC INTRODUCTION OF OTHER THERAPEUTIC SUBSTANCE INTO PERIPHERAL VEIN, PERCUTANEOUS APPROACH: ICD-10-PCS | Performed by: EMERGENCY MEDICINE

## 2025-06-26 RX ADMIN — ACETAMINOPHEN ONE MG: 500 TABLET, FILM COATED ORAL at 23:32

## 2025-06-26 RX ADMIN — SODIUM CHLORIDE ONE ML: 9 INJECTION, SOLUTION INTRAVENOUS at 18:41

## 2025-06-26 RX ADMIN — ONDANSETRON ONE MG: 2 INJECTION INTRAMUSCULAR; INTRAVENOUS at 18:41

## 2025-06-27 LAB
APPEARANCE UR: CLEAR
BACTERIA # UR AUTO: 1.9 /UL (ref 0–1359)
BILIRUB UR STRIP.AUTO-MCNC: NEGATIVE MG/DL
COLOR UR: YELLOW
EPITH CASTS URNS QL MICRO: 1.7 /UL (ref 0–25.1)
KETONES UR QL STRIP: NEGATIVE
LEUKOCYTE ESTERASE UR QL STRIP.AUTO: NEGATIVE
NITRITE UR QL STRIP: NEGATIVE
PH UR: 6.5 [PH] (ref 5–8)
PROT UR QL STRIP: NEGATIVE
PROT UR QL STRIP: NEGATIVE
RBC # BLD AUTO: 36.1 /UL (ref 0–23.9)
SP GR UR: 1.06 (ref 1.01–1.03)
UROBILINOGEN UR STRIP-MCNC: 0.2 MG/DL (ref 0.2–1)
WBC # UR AUTO: 1.3 /UL (ref 0–25.8)